# Patient Record
Sex: MALE | Race: WHITE | NOT HISPANIC OR LATINO | Employment: FULL TIME | URBAN - NONMETROPOLITAN AREA
[De-identification: names, ages, dates, MRNs, and addresses within clinical notes are randomized per-mention and may not be internally consistent; named-entity substitution may affect disease eponyms.]

---

## 2023-02-27 PROBLEM — M25.561 RIGHT KNEE PAIN: Status: ACTIVE | Noted: 2023-02-27

## 2023-02-27 PROBLEM — M19.90 ARTHRITIS: Status: ACTIVE | Noted: 2023-02-27

## 2023-02-27 PROBLEM — R76.8 ANTI-RNP ANTIBODIES PRESENT: Status: ACTIVE | Noted: 2023-02-27

## 2023-02-27 PROBLEM — E55.9 VITAMIN D DEFICIENCY: Status: ACTIVE | Noted: 2023-02-27

## 2023-02-27 PROBLEM — K21.00 GERD WITH ESOPHAGITIS: Status: ACTIVE | Noted: 2023-02-27

## 2023-02-27 PROBLEM — M54.50 LOW BACK PAIN: Status: ACTIVE | Noted: 2023-02-27

## 2023-02-27 PROBLEM — R29.818: Status: ACTIVE | Noted: 2023-02-27

## 2023-02-27 PROBLEM — M35.9 CONNECTIVE TISSUE DISEASE (MULTI): Status: ACTIVE | Noted: 2023-02-27

## 2023-02-27 PROBLEM — R61: Status: ACTIVE | Noted: 2023-02-27

## 2023-02-27 PROBLEM — Z04.9 CONDITION NOT FOUND: Status: ACTIVE | Noted: 2023-02-27

## 2023-02-27 PROBLEM — G47.00 INSOMNIA: Status: ACTIVE | Noted: 2023-02-27

## 2023-02-27 PROBLEM — M06.9 RHEUMATOID ARTHRITIS (MULTI): Status: ACTIVE | Noted: 2023-02-27

## 2023-02-27 PROBLEM — K58.0 IRRITABLE BOWEL SYNDROME WITH DIARRHEA: Status: ACTIVE | Noted: 2023-02-27

## 2023-02-27 PROBLEM — C44.91 BASAL CELL CARCINOMA: Status: ACTIVE | Noted: 2023-02-27

## 2023-02-27 PROBLEM — M15.9 GENERALIZED OSTEOARTHRITIS OF MULTIPLE SITES: Status: ACTIVE | Noted: 2023-02-27

## 2023-02-27 PROBLEM — K63.5 COLON POLYP: Status: ACTIVE | Noted: 2023-02-27

## 2023-02-27 PROBLEM — M50.90 DISC DISORDER OF CERVICAL REGION: Status: ACTIVE | Noted: 2023-02-27

## 2023-02-27 PROBLEM — S83.249A TORN MEDIAL MENISCUS: Status: ACTIVE | Noted: 2023-02-27

## 2023-02-27 RX ORDER — ELUXADOLINE 75 MG/1
1 TABLET, FILM COATED ORAL 2 TIMES DAILY
COMMUNITY
End: 2024-03-29 | Stop reason: SDUPTHER

## 2023-02-27 RX ORDER — DEXLANSOPRAZOLE 60 MG/1
1-2 CAPSULE, DELAYED RELEASE ORAL EVERY MORNING
COMMUNITY
End: 2024-05-15 | Stop reason: SDUPTHER

## 2023-02-27 RX ORDER — MULTIVITAMIN
TABLET ORAL
COMMUNITY

## 2023-02-27 RX ORDER — AMITRIPTYLINE HYDROCHLORIDE 25 MG/1
1 TABLET, FILM COATED ORAL NIGHTLY
COMMUNITY
Start: 2016-01-07 | End: 2023-04-17

## 2023-03-31 ENCOUNTER — OFFICE VISIT (OUTPATIENT)
Dept: PRIMARY CARE | Facility: CLINIC | Age: 53
End: 2023-03-31
Payer: COMMERCIAL

## 2023-03-31 VITALS
HEIGHT: 75 IN | WEIGHT: 222.2 LBS | BODY MASS INDEX: 27.63 KG/M2 | SYSTOLIC BLOOD PRESSURE: 142 MMHG | DIASTOLIC BLOOD PRESSURE: 80 MMHG | HEART RATE: 81 BPM | OXYGEN SATURATION: 98 %

## 2023-03-31 DIAGNOSIS — F51.01 PRIMARY INSOMNIA: ICD-10-CM

## 2023-03-31 DIAGNOSIS — Z23 NEED FOR SHINGLES VACCINE: ICD-10-CM

## 2023-03-31 DIAGNOSIS — M19.90 ARTHRITIS: ICD-10-CM

## 2023-03-31 DIAGNOSIS — M18.0 PRIMARY OSTEOARTHRITIS OF BOTH FIRST CARPOMETACARPAL JOINTS: Primary | ICD-10-CM

## 2023-03-31 PROCEDURE — 90750 HZV VACC RECOMBINANT IM: CPT | Performed by: FAMILY MEDICINE

## 2023-03-31 PROCEDURE — 1036F TOBACCO NON-USER: CPT | Performed by: FAMILY MEDICINE

## 2023-03-31 PROCEDURE — 90471 IMMUNIZATION ADMIN: CPT | Performed by: FAMILY MEDICINE

## 2023-03-31 PROCEDURE — 99396 PREV VISIT EST AGE 40-64: CPT | Performed by: FAMILY MEDICINE

## 2023-03-31 RX ORDER — BACLOFEN 20 MG/1
1 TABLET ORAL
COMMUNITY
Start: 2022-07-30 | End: 2023-03-31 | Stop reason: ALTCHOICE

## 2023-03-31 RX ORDER — TRAZODONE HYDROCHLORIDE 50 MG/1
50-150 TABLET ORAL NIGHTLY PRN
Qty: 90 TABLET | Refills: 5 | Status: SHIPPED | OUTPATIENT
Start: 2023-03-31 | End: 2024-05-08

## 2023-03-31 RX ORDER — METHYLPREDNISOLONE ACETATE 80 MG/ML
80 INJECTION, SUSPENSION INTRA-ARTICULAR; INTRALESIONAL; INTRAMUSCULAR; SOFT TISSUE ONCE
Status: COMPLETED | OUTPATIENT
Start: 2023-03-31 | End: 2023-03-31

## 2023-03-31 RX ADMIN — METHYLPREDNISOLONE ACETATE 80 MG: 80 INJECTION, SUSPENSION INTRA-ARTICULAR; INTRALESIONAL; INTRAMUSCULAR; SOFT TISSUE at 10:55

## 2023-03-31 ASSESSMENT — ENCOUNTER SYMPTOMS
DIARRHEA: 0
BLOOD IN STOOL: 0
NERVOUS/ANXIOUS: 0
DIFFICULTY URINATING: 0
CONFUSION: 0
PALPITATIONS: 0
JOINT SWELLING: 0
HEMATURIA: 0
APPETITE CHANGE: 0
SHORTNESS OF BREATH: 0
UNEXPECTED WEIGHT CHANGE: 0
SEIZURES: 0
FEVER: 0
TROUBLE SWALLOWING: 0
COLOR CHANGE: 0
CONSTIPATION: 0

## 2023-03-31 NOTE — PROGRESS NOTES
"Subjective   Patient ID: Norris Peña is a 52 y.o. male who presents for Follow-up.  HPI  Pleasant 52 year old  male presents for a yearly check up. Patient states that he has mixed connective tissue disease and is having issues with his thumbs pulling out of their sockets. Patient requesting referral to a hand surgeon. Patient states that he is having a lot of pain due to mixed connective tissue disease and would like a cortisone injection which helps with the chronic pain. Patient states that he is having issue with being able to fall asleep at night. Patient states that he is working very long hours and travelling and unable to fall asleep. Patient denies fever, chills, night sweats, n/v, abd pain, diarrhea, constipation, CP, heart palpitations.    Review of Systems   Constitutional:  Negative for appetite change, fever and unexpected weight change.   HENT:  Negative for congestion and trouble swallowing.    Eyes:  Negative for visual disturbance.   Respiratory:  Negative for shortness of breath.    Cardiovascular:  Negative for chest pain, palpitations and leg swelling.   Gastrointestinal:  Negative for blood in stool, constipation and diarrhea.   Genitourinary:  Negative for difficulty urinating and hematuria.   Musculoskeletal:  Negative for gait problem and joint swelling.   Skin:  Negative for color change.   Allergic/Immunologic: Negative for immunocompromised state.   Neurological:  Negative for seizures and syncope.   Psychiatric/Behavioral:  Negative for confusion and suicidal ideas. The patient is not nervous/anxious.        Objective   /80   Pulse 81   Ht 1.905 m (6' 3\")   Wt 101 kg (222 lb 3.2 oz)   SpO2 98%   BMI 27.77 kg/m²     Physical Exam  Constitutional:       General: He is not in acute distress.     Appearance: Normal appearance. He is not ill-appearing.   HENT:      Head: Normocephalic and atraumatic.      Right Ear: Tympanic membrane normal.      Left Ear: Tympanic membrane " normal.      Nose: Nose normal.      Mouth/Throat:      Mouth: Mucous membranes are moist.   Eyes:      Pupils: Pupils are equal, round, and reactive to light.   Cardiovascular:      Rate and Rhythm: Normal rate and regular rhythm.      Heart sounds: No murmur heard.     No friction rub. No gallop.   Pulmonary:      Effort: Pulmonary effort is normal.      Breath sounds: Normal breath sounds.   Abdominal:      General: Abdomen is flat. There is no distension.      Palpations: Abdomen is soft.      Tenderness: There is no abdominal tenderness. There is no guarding.      Hernia: No hernia is present.   Musculoskeletal:         General: Normal range of motion.   Skin:     General: Skin is warm and dry.   Neurological:      General: No focal deficit present.      Mental Status: He is alert. Mental status is at baseline.      Cranial Nerves: No cranial nerve deficit.      Motor: No weakness.      Gait: Gait normal.   Psychiatric:         Mood and Affect: Mood normal.         Behavior: Behavior normal.         Thought Content: Thought content normal.         Judgment: Judgment normal.       Assessment/Plan   Problem List Items Addressed This Visit    None    Assessment:  -Mixed connective tissue disease: Possibly causing low back pain: faild Plaquenil methotrexate,arava, Nucynta, butrans  Depo today   -Julio CMC arthritis:  Refer to ortho  -Insomnia  Failed mirtazapine, amitriptyline  -Hyperlipidemia  -Vitamin D deficiency  -Multiple polyps    following      Health Maintenance Reminder:  -Blood Work: labs requested  -PSA: labs requested  -Hep C: completed  -Colonoscopy: GI  -Lung Cancer: 50-80y  -AAA: 65-79 smoker.   -Shingrix: >50y  -Prevnar 20: 65y  -Flu: Yearly

## 2023-03-31 NOTE — PROGRESS NOTES
"Subjective   Patient ID: Norris Peña is a 52 y.o. male who presents for Follow-up.  HPI    Review of Systems    Objective   /80   Pulse 81   Ht 1.905 m (6' 3\")   Wt 101 kg (222 lb 3.2 oz)   SpO2 98%   BMI 27.77 kg/m²     Physical Exam    Assessment/Plan   Problem List Items Addressed This Visit    None    Assessment:  -Mixed connective tissue disease: Possibly causing low back pain: faild Plaquenil methotrexate,arava, Nucynta, butrans  -Insomnia  -Hyperlipidemia  -Vitamin D deficiency  -Multiple polyps     Plan:  -continue THC  -continue amitriptyline  -colonoscopy ordered     Health Maintenance Reminder:  -Blood Work: labs requested  -PSA: labs requested  -Hep C: completed  -Colonoscopy: ordered  -Lung Cancer: 50-80y  -AAA: 65-79 smoker.   -Shingrix: >50y  -Prevnar 20: 65y  -Flu: Yearly  "

## 2023-04-03 ASSESSMENT — PATIENT HEALTH QUESTIONNAIRE - PHQ9
1. LITTLE INTEREST OR PLEASURE IN DOING THINGS: NOT AT ALL
2. FEELING DOWN, DEPRESSED OR HOPELESS: NOT AT ALL
SUM OF ALL RESPONSES TO PHQ9 QUESTIONS 1 AND 2: 0

## 2023-04-15 DIAGNOSIS — F51.01 PRIMARY INSOMNIA: Primary | ICD-10-CM

## 2023-04-17 RX ORDER — AMITRIPTYLINE HYDROCHLORIDE 25 MG/1
TABLET, FILM COATED ORAL
Qty: 90 TABLET | Refills: 2 | Status: SHIPPED | OUTPATIENT
Start: 2023-04-17 | End: 2024-03-29 | Stop reason: SDUPTHER

## 2023-09-08 ENCOUNTER — OFFICE VISIT (OUTPATIENT)
Dept: PRIMARY CARE | Facility: CLINIC | Age: 53
End: 2023-09-08
Payer: COMMERCIAL

## 2023-09-08 VITALS — BODY MASS INDEX: 27.62 KG/M2 | WEIGHT: 221 LBS | OXYGEN SATURATION: 96 % | HEART RATE: 77 BPM

## 2023-09-08 DIAGNOSIS — Z23 NEED FOR SHINGLES VACCINE: Primary | ICD-10-CM

## 2023-09-08 DIAGNOSIS — Z12.5 PROSTATE CANCER SCREENING: ICD-10-CM

## 2023-09-08 DIAGNOSIS — M06.00 RHEUMATOID ARTHRITIS WITH NEGATIVE RHEUMATOID FACTOR, INVOLVING UNSPECIFIED SITE (MULTI): ICD-10-CM

## 2023-09-08 DIAGNOSIS — Z13.220 LIPID SCREENING: ICD-10-CM

## 2023-09-08 DIAGNOSIS — Z13.1 DIABETES MELLITUS SCREENING: ICD-10-CM

## 2023-09-08 DIAGNOSIS — B37.0 THRUSH: ICD-10-CM

## 2023-09-08 DIAGNOSIS — Z82.49 FAMILY HISTORY OF CHF (CONGESTIVE HEART FAILURE): ICD-10-CM

## 2023-09-08 DIAGNOSIS — Z13.0 SCREENING FOR DEFICIENCY ANEMIA: ICD-10-CM

## 2023-09-08 LAB
CHOLESTEROL (MG/DL) IN SER/PLAS: 135 MG/DL (ref 0–199)
CHOLESTEROL IN HDL (MG/DL) IN SER/PLAS: 30.6 MG/DL
CHOLESTEROL/HDL RATIO: 4.4
LDL: 71 MG/DL (ref 0–99)
TRIGLYCERIDE (MG/DL) IN SER/PLAS: 167 MG/DL (ref 0–149)
VLDL: 33 MG/DL (ref 0–40)

## 2023-09-08 PROCEDURE — 90750 HZV VACC RECOMBINANT IM: CPT | Performed by: FAMILY MEDICINE

## 2023-09-08 PROCEDURE — 85027 COMPLETE CBC AUTOMATED: CPT

## 2023-09-08 PROCEDURE — 84153 ASSAY OF PSA TOTAL: CPT

## 2023-09-08 PROCEDURE — 90471 IMMUNIZATION ADMIN: CPT | Performed by: FAMILY MEDICINE

## 2023-09-08 PROCEDURE — 80061 LIPID PANEL: CPT

## 2023-09-08 PROCEDURE — 80053 COMPREHEN METABOLIC PANEL: CPT

## 2023-09-08 PROCEDURE — 99214 OFFICE O/P EST MOD 30 MIN: CPT | Performed by: FAMILY MEDICINE

## 2023-09-08 PROCEDURE — 1036F TOBACCO NON-USER: CPT | Performed by: FAMILY MEDICINE

## 2023-09-08 RX ORDER — METOCLOPRAMIDE 10 MG/1
10 TABLET ORAL 4 TIMES DAILY
COMMUNITY
Start: 2023-04-07 | End: 2024-05-20 | Stop reason: WASHOUT

## 2023-09-08 RX ORDER — CLOTRIMAZOLE 10 MG/1
10 LOZENGE ORAL; TOPICAL
Qty: 70 TABLET | Refills: 1 | Status: SHIPPED | OUTPATIENT
Start: 2023-09-08 | End: 2024-04-10

## 2023-09-08 RX ORDER — ONDANSETRON 4 MG/1
4 TABLET, ORALLY DISINTEGRATING ORAL EVERY 8 HOURS PRN
COMMUNITY
Start: 2023-04-07 | End: 2024-05-20 | Stop reason: WASHOUT

## 2023-09-08 ASSESSMENT — ENCOUNTER SYMPTOMS
CONFUSION: 0
HEMATURIA: 0
UNEXPECTED WEIGHT CHANGE: 0
JOINT SWELLING: 0
CONSTIPATION: 0
SEIZURES: 0
COLOR CHANGE: 0
SHORTNESS OF BREATH: 0
PALPITATIONS: 0
FEVER: 0
DIFFICULTY URINATING: 0
APPETITE CHANGE: 0
BLOOD IN STOOL: 0
NERVOUS/ANXIOUS: 0
TROUBLE SWALLOWING: 0
DIARRHEA: 0

## 2023-09-08 NOTE — PROGRESS NOTES
"Subjective   Patient ID: Norris Peña is a 53 y.o. female who presents for Annual Exam (Second shingles shot, chest pains off and on when overwhelmed and stressed, he wants to be referred to cardiologist ).  HPI  53y CM  Here for check up  Only concerned for a stabbing chest pain- feels like a \"really bad muscle pain\"  Usually at rest or when falling asleep  Had a stress test a \"few\" years ago and was normal  No palps, sweating  Under a lot of stress    Chronic pain:  -using thc and prendisone prn    Wants shingles #2    Insomnia:  -trazodone helps    BP elevated when coming in but wife is a nurse and checking and normal     Review of Systems   Constitutional:  Negative for appetite change, fever and unexpected weight change.   HENT:  Negative for congestion and trouble swallowing.    Eyes:  Negative for visual disturbance.   Respiratory:  Negative for shortness of breath.    Cardiovascular:  Negative for chest pain, palpitations and leg swelling.   Gastrointestinal:  Negative for blood in stool, constipation and diarrhea.   Genitourinary:  Negative for difficulty urinating and hematuria.   Musculoskeletal:  Negative for gait problem and joint swelling.   Skin:  Negative for color change.   Allergic/Immunologic: Negative for immunocompromised state.   Neurological:  Negative for seizures and syncope.   Psychiatric/Behavioral:  Negative for confusion and suicidal ideas. The patient is not nervous/anxious.        Objective   Pulse 77   Wt 100 kg (221 lb)   SpO2 96%   BMI 27.62 kg/m²     Physical Exam  Constitutional:       General: She is not in acute distress.     Appearance: Normal appearance. She is not ill-appearing.   HENT:      Head: Normocephalic and atraumatic.      Right Ear: Tympanic membrane normal.      Left Ear: Tympanic membrane normal.      Nose: Nose normal.      Mouth/Throat:      Mouth: Mucous membranes are moist.   Eyes:      Pupils: Pupils are equal, round, and reactive to light.   Cardiovascular: "      Rate and Rhythm: Normal rate and regular rhythm.      Heart sounds: No murmur heard.     No friction rub. No gallop.   Pulmonary:      Effort: Pulmonary effort is normal.      Breath sounds: Normal breath sounds.   Abdominal:      General: Abdomen is flat. There is no distension.      Palpations: Abdomen is soft.      Tenderness: There is no abdominal tenderness. There is no guarding.      Hernia: No hernia is present.   Musculoskeletal:         General: Normal range of motion.   Skin:     General: Skin is warm and dry.   Neurological:      General: No focal deficit present.      Mental Status: She is alert. Mental status is at baseline.      Cranial Nerves: No cranial nerve deficit.      Motor: No weakness.      Gait: Gait normal.   Psychiatric:         Mood and Affect: Mood normal.         Behavior: Behavior normal.         Thought Content: Thought content normal.         Judgment: Judgment normal.         Assessment/Plan   Problem List Items Addressed This Visit       Rheumatoid arthritis (CMS/HCC)     Other Visit Diagnoses       Need for shingles vaccine    -  Primary    Relevant Orders    Zoster vaccine, recombinant, adult (SHINGRIX) (Completed)    Screening for deficiency anemia        Relevant Orders    CBC (Completed)    Diabetes mellitus screening        Relevant Orders    Comprehensive Metabolic Panel (Completed)    Lipid screening        Relevant Orders    Lipid Panel (Completed)    Prostate cancer screening        Relevant Orders    Prostate Specific Antigen, Screen (Completed)    Family history of CHF (congestive heart failure)        Relevant Orders    CT cardiac scoring wo IV contrast    Thrush        Relevant Medications    clotrimazole (Mycelex) 10 mg miik          Assessment:  -Mixed connective tissue disease: Possibly causing low back pain: faild Plaquenil methotrexate,arava, Nucynta, butrans  Depo today     -Julio CMC arthritis:  Refer to ortho    -Insomnia  Failed mirtazapine,  amitriptyline    -Hyperlipidemia    -Vitamin D deficiency    -Multiple polyps    following      Health Maintenance Reminder:  -Blood Work: today  -PSA: today  -Hep C: completed  -Colonoscopy: GI  -Lung Cancer: 50-80y  -AAA: 65-79 smoker.   -Shingrix: >50y  -Prevnar 20: 65y  -Flu: Yearly

## 2023-09-11 LAB
ALANINE AMINOTRANSFERASE (SGPT) (U/L) IN SER/PLAS: 16 U/L (ref 10–52)
ALBUMIN (G/DL) IN SER/PLAS: 4.4 G/DL (ref 3.4–5)
ALKALINE PHOSPHATASE (U/L) IN SER/PLAS: 45 U/L (ref 33–120)
ANION GAP IN SER/PLAS: 13 MMOL/L (ref 10–20)
ASPARTATE AMINOTRANSFERASE (SGOT) (U/L) IN SER/PLAS: 16 U/L (ref 9–39)
BILIRUBIN TOTAL (MG/DL) IN SER/PLAS: 0.6 MG/DL (ref 0–1.2)
CALCIUM (MG/DL) IN SER/PLAS: 9.4 MG/DL (ref 8.6–10.3)
CARBON DIOXIDE, TOTAL (MMOL/L) IN SER/PLAS: 27 MMOL/L (ref 21–32)
CHLORIDE (MMOL/L) IN SER/PLAS: 102 MMOL/L (ref 98–107)
CREATININE (MG/DL) IN SER/PLAS: 1.16 MG/DL (ref 0.5–1.3)
ERYTHROCYTE DISTRIBUTION WIDTH (RATIO) BY AUTOMATED COUNT: 12.5 % (ref 11.5–14.5)
ERYTHROCYTE MEAN CORPUSCULAR HEMOGLOBIN CONCENTRATION (G/DL) BY AUTOMATED: 32.4 G/DL (ref 32–36)
ERYTHROCYTE MEAN CORPUSCULAR VOLUME (FL) BY AUTOMATED COUNT: 90 FL (ref 80–100)
ERYTHROCYTES (10*6/UL) IN BLOOD BY AUTOMATED COUNT: 5.74 X10E12/L (ref 4.5–5.9)
GFR MALE: 76 ML/MIN/1.73M2
GLUCOSE (MG/DL) IN SER/PLAS: 90 MG/DL (ref 74–99)
HEMATOCRIT (%) IN BLOOD BY AUTOMATED COUNT: 51.9 % (ref 41–52)
HEMOGLOBIN (G/DL) IN BLOOD: 16.8 G/DL (ref 13.5–17.5)
LEUKOCYTES (10*3/UL) IN BLOOD BY AUTOMATED COUNT: 5.5 X10E9/L (ref 4.4–11.3)
PLATELETS (10*3/UL) IN BLOOD AUTOMATED COUNT: 197 X10E9/L (ref 150–450)
POTASSIUM (MMOL/L) IN SER/PLAS: 4.1 MMOL/L (ref 3.5–5.3)
PROSTATE SPECIFIC ANTIGEN,SCREEN: 0.77 NG/ML (ref 0–4)
PROTEIN TOTAL: 6.6 G/DL (ref 6.4–8.2)
SODIUM (MMOL/L) IN SER/PLAS: 138 MMOL/L (ref 136–145)
UREA NITROGEN (MG/DL) IN SER/PLAS: 10 MG/DL (ref 6–23)

## 2023-09-11 ASSESSMENT — PATIENT HEALTH QUESTIONNAIRE - PHQ9
1. LITTLE INTEREST OR PLEASURE IN DOING THINGS: NOT AT ALL
SUM OF ALL RESPONSES TO PHQ9 QUESTIONS 1 AND 2: 0
2. FEELING DOWN, DEPRESSED OR HOPELESS: NOT AT ALL

## 2023-11-20 DIAGNOSIS — J20.9 ACUTE BRONCHITIS, UNSPECIFIED ORGANISM: Primary | ICD-10-CM

## 2023-11-20 RX ORDER — AZITHROMYCIN 250 MG/1
TABLET, FILM COATED ORAL
Qty: 6 TABLET | Refills: 0 | Status: SHIPPED | OUTPATIENT
Start: 2023-11-20 | End: 2023-11-25

## 2023-11-20 RX ORDER — ALBUTEROL SULFATE 90 UG/1
2 AEROSOL, METERED RESPIRATORY (INHALATION) EVERY 4 HOURS PRN
Qty: 8 G | Refills: 5 | Status: SHIPPED | OUTPATIENT
Start: 2023-11-20 | End: 2024-05-20 | Stop reason: WASHOUT

## 2023-12-14 DIAGNOSIS — J20.9 ACUTE BRONCHITIS, UNSPECIFIED ORGANISM: Primary | ICD-10-CM

## 2023-12-14 RX ORDER — PREDNISONE 20 MG/1
40 TABLET ORAL DAILY
Qty: 10 TABLET | Refills: 0 | Status: SHIPPED | OUTPATIENT
Start: 2023-12-14 | End: 2023-12-19

## 2023-12-14 RX ORDER — LEVOFLOXACIN 500 MG/1
500 TABLET, FILM COATED ORAL DAILY
Qty: 10 TABLET | Refills: 0 | Status: SHIPPED | OUTPATIENT
Start: 2023-12-14 | End: 2023-12-24

## 2023-12-27 ENCOUNTER — HOSPITAL ENCOUNTER (OUTPATIENT)
Dept: RADIOLOGY | Facility: HOSPITAL | Age: 53
Discharge: HOME | End: 2023-12-27
Payer: COMMERCIAL

## 2023-12-27 DIAGNOSIS — Z82.49 FAMILY HISTORY OF CHF (CONGESTIVE HEART FAILURE): ICD-10-CM

## 2023-12-28 ENCOUNTER — TELEPHONE (OUTPATIENT)
Dept: PRIMARY CARE | Facility: CLINIC | Age: 53
End: 2023-12-28
Payer: COMMERCIAL

## 2023-12-28 NOTE — TELEPHONE ENCOUNTER
He had a heart test scheduled for yesterday but they could not do it due to his HR being 110-115, he usually runs high but in order for them to do the test he needs to be in the 90s, asked if you could send in medication to help it go down?

## 2023-12-29 DIAGNOSIS — R00.0 TACHYCARDIA: Primary | ICD-10-CM

## 2023-12-29 RX ORDER — METOPROLOL TARTRATE 25 MG/1
25 TABLET, FILM COATED ORAL 2 TIMES DAILY
Qty: 30 TABLET | Refills: 0 | Status: SHIPPED | OUTPATIENT
Start: 2023-12-29 | End: 2024-01-13

## 2024-01-04 ENCOUNTER — HOSPITAL ENCOUNTER (OUTPATIENT)
Dept: RADIOLOGY | Facility: HOSPITAL | Age: 54
Discharge: HOME | End: 2024-01-04
Payer: COMMERCIAL

## 2024-01-04 PROCEDURE — 75571 CT HRT W/O DYE W/CA TEST: CPT

## 2024-01-12 DIAGNOSIS — R00.0 TACHYCARDIA: ICD-10-CM

## 2024-01-13 RX ORDER — METOPROLOL TARTRATE 25 MG/1
25 TABLET, FILM COATED ORAL 2 TIMES DAILY
Qty: 180 TABLET | Refills: 1 | Status: SHIPPED | OUTPATIENT
Start: 2024-01-13 | End: 2024-05-20 | Stop reason: WASHOUT

## 2024-01-24 DIAGNOSIS — J40 BRONCHITIS: ICD-10-CM

## 2024-01-24 DIAGNOSIS — R09.81 NASAL CONGESTION: Primary | ICD-10-CM

## 2024-01-24 RX ORDER — IPRATROPIUM BROMIDE 42 UG/1
2 SPRAY, METERED NASAL 3 TIMES DAILY PRN
Qty: 21 ML | Refills: 1 | Status: SHIPPED | OUTPATIENT
Start: 2024-01-24 | End: 2024-02-02

## 2024-01-26 ENCOUNTER — HOSPITAL ENCOUNTER (EMERGENCY)
Facility: HOSPITAL | Age: 54
Discharge: HOME | End: 2024-01-26
Attending: EMERGENCY MEDICINE
Payer: COMMERCIAL

## 2024-01-26 ENCOUNTER — APPOINTMENT (OUTPATIENT)
Dept: CARDIOLOGY | Facility: HOSPITAL | Age: 54
End: 2024-01-26
Payer: COMMERCIAL

## 2024-01-26 ENCOUNTER — APPOINTMENT (OUTPATIENT)
Dept: RADIOLOGY | Facility: HOSPITAL | Age: 54
End: 2024-01-26
Payer: COMMERCIAL

## 2024-01-26 VITALS
WEIGHT: 206 LBS | HEART RATE: 96 BPM | RESPIRATION RATE: 20 BRPM | SYSTOLIC BLOOD PRESSURE: 129 MMHG | BODY MASS INDEX: 25.61 KG/M2 | OXYGEN SATURATION: 97 % | DIASTOLIC BLOOD PRESSURE: 79 MMHG | TEMPERATURE: 99 F | HEIGHT: 75 IN

## 2024-01-26 DIAGNOSIS — R05.1 ACUTE COUGH: Primary | ICD-10-CM

## 2024-01-26 LAB
ALBUMIN SERPL BCP-MCNC: 4.1 G/DL (ref 3.4–5)
ALP SERPL-CCNC: 44 U/L (ref 33–120)
ALT SERPL W P-5'-P-CCNC: 12 U/L (ref 10–52)
ANION GAP SERPL CALC-SCNC: 11 MMOL/L (ref 10–20)
AST SERPL W P-5'-P-CCNC: 13 U/L (ref 9–39)
BASOPHILS # BLD AUTO: 0.03 X10*3/UL (ref 0–0.1)
BASOPHILS NFR BLD AUTO: 0.2 %
BILIRUB SERPL-MCNC: 0.7 MG/DL (ref 0–1.2)
BUN SERPL-MCNC: 8 MG/DL (ref 6–23)
CALCIUM SERPL-MCNC: 9 MG/DL (ref 8.6–10.3)
CHLORIDE SERPL-SCNC: 102 MMOL/L (ref 98–107)
CO2 SERPL-SCNC: 25 MMOL/L (ref 21–32)
CREAT SERPL-MCNC: 1.1 MG/DL (ref 0.5–1.3)
EGFRCR SERPLBLD CKD-EPI 2021: 81 ML/MIN/1.73M*2
EOSINOPHIL # BLD AUTO: 0.03 X10*3/UL (ref 0–0.7)
EOSINOPHIL NFR BLD AUTO: 0.2 %
ERYTHROCYTE [DISTWIDTH] IN BLOOD BY AUTOMATED COUNT: 12 % (ref 11.5–14.5)
FLUAV RNA RESP QL NAA+PROBE: NOT DETECTED
FLUBV RNA RESP QL NAA+PROBE: NOT DETECTED
GLUCOSE SERPL-MCNC: 118 MG/DL (ref 74–99)
HCT VFR BLD AUTO: 46.7 % (ref 41–52)
HGB BLD-MCNC: 16.1 G/DL (ref 13.5–17.5)
IMM GRANULOCYTES # BLD AUTO: 0.11 X10*3/UL (ref 0–0.7)
IMM GRANULOCYTES NFR BLD AUTO: 0.7 % (ref 0–0.9)
LYMPHOCYTES # BLD AUTO: 0.28 X10*3/UL (ref 1.2–4.8)
LYMPHOCYTES NFR BLD AUTO: 1.8 %
MAGNESIUM SERPL-MCNC: 1.75 MG/DL (ref 1.6–2.4)
MCH RBC QN AUTO: 29.7 PG (ref 26–34)
MCHC RBC AUTO-ENTMCNC: 34.5 G/DL (ref 32–36)
MCV RBC AUTO: 86 FL (ref 80–100)
MONOCYTES # BLD AUTO: 0.54 X10*3/UL (ref 0.1–1)
MONOCYTES NFR BLD AUTO: 3.5 %
NEUTROPHILS # BLD AUTO: 14.49 X10*3/UL (ref 1.2–7.7)
NEUTROPHILS NFR BLD AUTO: 93.6 %
NRBC BLD-RTO: 0 /100 WBCS (ref 0–0)
PLATELET # BLD AUTO: 195 X10*3/UL (ref 150–450)
POTASSIUM SERPL-SCNC: 3.7 MMOL/L (ref 3.5–5.3)
PROT SERPL-MCNC: 6.8 G/DL (ref 6.4–8.2)
RBC # BLD AUTO: 5.42 X10*6/UL (ref 4.5–5.9)
SARS-COV-2 RNA RESP QL NAA+PROBE: NOT DETECTED
SODIUM SERPL-SCNC: 134 MMOL/L (ref 136–145)
WBC # BLD AUTO: 15.5 X10*3/UL (ref 4.4–11.3)

## 2024-01-26 PROCEDURE — 85025 COMPLETE CBC W/AUTO DIFF WBC: CPT | Performed by: EMERGENCY MEDICINE

## 2024-01-26 PROCEDURE — 71275 CT ANGIOGRAPHY CHEST: CPT

## 2024-01-26 PROCEDURE — 93005 ELECTROCARDIOGRAM TRACING: CPT

## 2024-01-26 PROCEDURE — 83735 ASSAY OF MAGNESIUM: CPT | Performed by: EMERGENCY MEDICINE

## 2024-01-26 PROCEDURE — 2500000004 HC RX 250 GENERAL PHARMACY W/ HCPCS (ALT 636 FOR OP/ED): Performed by: EMERGENCY MEDICINE

## 2024-01-26 PROCEDURE — 71275 CT ANGIOGRAPHY CHEST: CPT | Performed by: RADIOLOGY

## 2024-01-26 PROCEDURE — 36415 COLL VENOUS BLD VENIPUNCTURE: CPT | Performed by: EMERGENCY MEDICINE

## 2024-01-26 PROCEDURE — 80053 COMPREHEN METABOLIC PANEL: CPT | Performed by: EMERGENCY MEDICINE

## 2024-01-26 PROCEDURE — 2500000001 HC RX 250 WO HCPCS SELF ADMINISTERED DRUGS (ALT 637 FOR MEDICARE OP): Performed by: EMERGENCY MEDICINE

## 2024-01-26 PROCEDURE — 99285 EMERGENCY DEPT VISIT HI MDM: CPT | Mod: 25

## 2024-01-26 PROCEDURE — 87636 SARSCOV2 & INF A&B AMP PRB: CPT | Performed by: EMERGENCY MEDICINE

## 2024-01-26 PROCEDURE — 2550000001 HC RX 255 CONTRASTS: Performed by: EMERGENCY MEDICINE

## 2024-01-26 PROCEDURE — 99284 EMERGENCY DEPT VISIT MOD MDM: CPT | Performed by: EMERGENCY MEDICINE

## 2024-01-26 RX ORDER — GUAIFENESIN, PSEUDOEPHEDRINE HYDROCHLORIDE 600; 60 MG/1; MG/1
1-2 TABLET, EXTENDED RELEASE ORAL EVERY 12 HOURS
Qty: 14 TABLET | Refills: 0 | Status: SHIPPED | OUTPATIENT
Start: 2024-01-26 | End: 2024-05-20 | Stop reason: WASHOUT

## 2024-01-26 RX ORDER — GUAIFENESIN 100 MG/5ML
400 SOLUTION ORAL ONCE
Status: COMPLETED | OUTPATIENT
Start: 2024-01-26 | End: 2024-01-26

## 2024-01-26 RX ORDER — BENZONATATE 100 MG/1
100 CAPSULE ORAL EVERY 8 HOURS
Qty: 21 CAPSULE | Refills: 0 | Status: SHIPPED | OUTPATIENT
Start: 2024-01-26 | End: 2024-02-02 | Stop reason: SDUPTHER

## 2024-01-26 RX ORDER — BENZONATATE 100 MG/1
100 CAPSULE ORAL ONCE
Status: COMPLETED | OUTPATIENT
Start: 2024-01-26 | End: 2024-01-26

## 2024-01-26 RX ORDER — ACETAMINOPHEN 325 MG/1
650 TABLET ORAL ONCE
Status: COMPLETED | OUTPATIENT
Start: 2024-01-26 | End: 2024-01-26

## 2024-01-26 RX ADMIN — SODIUM CHLORIDE, POTASSIUM CHLORIDE, SODIUM LACTATE AND CALCIUM CHLORIDE 1000 ML: 600; 310; 30; 20 INJECTION, SOLUTION INTRAVENOUS at 09:19

## 2024-01-26 RX ADMIN — BENZONATATE 100 MG: 100 CAPSULE ORAL at 09:20

## 2024-01-26 RX ADMIN — ACETAMINOPHEN 650 MG: 325 TABLET ORAL at 09:20

## 2024-01-26 RX ADMIN — GUAIFENESIN 400 MG: 200 SOLUTION ORAL at 09:19

## 2024-01-26 RX ADMIN — IOHEXOL 71 ML: 350 INJECTION, SOLUTION INTRAVENOUS at 10:00

## 2024-01-26 ASSESSMENT — PAIN SCALES - GENERAL
PAINLEVEL_OUTOF10: 4
PAINLEVEL_OUTOF10: 0 - NO PAIN

## 2024-01-26 ASSESSMENT — PAIN DESCRIPTION - DESCRIPTORS: DESCRIPTORS: ACHING

## 2024-01-26 ASSESSMENT — COLUMBIA-SUICIDE SEVERITY RATING SCALE - C-SSRS
6. HAVE YOU EVER DONE ANYTHING, STARTED TO DO ANYTHING, OR PREPARED TO DO ANYTHING TO END YOUR LIFE?: NO
1. IN THE PAST MONTH, HAVE YOU WISHED YOU WERE DEAD OR WISHED YOU COULD GO TO SLEEP AND NOT WAKE UP?: NO
2. HAVE YOU ACTUALLY HAD ANY THOUGHTS OF KILLING YOURSELF?: NO

## 2024-01-26 ASSESSMENT — LIFESTYLE VARIABLES
EVER HAD A DRINK FIRST THING IN THE MORNING TO STEADY YOUR NERVES TO GET RID OF A HANGOVER: NO
HAVE YOU EVER FELT YOU SHOULD CUT DOWN ON YOUR DRINKING: NO
HAVE PEOPLE ANNOYED YOU BY CRITICIZING YOUR DRINKING: NO
REASON UNABLE TO ASSESS: NO
EVER FELT BAD OR GUILTY ABOUT YOUR DRINKING: NO

## 2024-01-26 ASSESSMENT — PAIN DESCRIPTION - PAIN TYPE: TYPE: ACUTE PAIN

## 2024-01-26 ASSESSMENT — PAIN DESCRIPTION - LOCATION: LOCATION: CHEST

## 2024-01-26 ASSESSMENT — PAIN - FUNCTIONAL ASSESSMENT: PAIN_FUNCTIONAL_ASSESSMENT: 0-10

## 2024-01-26 ASSESSMENT — PAIN DESCRIPTION - ORIENTATION: ORIENTATION: RIGHT

## 2024-01-26 NOTE — ED PROVIDER NOTES
"HPI   Chief Complaint   Patient presents with    Cough       53-year-old male with history of connective tissue presents to the ED for several months of cough with no chest pain.  He reports being seen by his primary care provider and prescribed breathing treatments, steroids and antibiotics with minimal relief.  He reports worsening cough at night.  He is complaining of right-sided sharp chest pain which feels like rib pain worsened with cough.  Denies any trauma.  Denies any fevers.  Reports 40 pound weight loss over the last year he reports due to \"stress.\"                          [place] Coma Scale Score: 15                  Patient History   Past Medical History:   Diagnosis Date    Disorder of the skin and subcutaneous tissue, unspecified 05/25/2018    Skin lesion    Encounter for immunization 10/27/2015    Needs flu shot    Encounter for immunization 09/06/2016    Need for prophylactic vaccination and inoculation against influenza    Encounter for screening for diabetes mellitus 06/15/2017    Diabetes mellitus screening    Encounter for screening for diseases of the blood and blood-forming organs and certain disorders involving the immune mechanism 06/15/2017    Screening for deficiency anemia    Encounter for screening for lipoid disorders 06/15/2017    Screening, lipid    Encounter for screening for respiratory tuberculosis 12/31/2015    Tuberculosis screening    Hemorrhage of anus and rectum 09/17/2012    Hemorrhage of rectum and anus    Other conditions influencing health status 05/11/2015    Myalgia and myositis    Pain in unspecified joint 05/03/2016    Joint pain    Pain in unspecified knee 05/03/2016    Knee pain    Personal history of other diseases of the nervous system and sense organs 04/19/2013    History of sciatica    Personal history of other infectious and parasitic diseases 04/12/2017    History of candidiasis of mouth    Personal history of other mental and behavioral disorders 02/18/2013 "    History of depression    Personal history of other specified conditions 09/02/2016    History of diarrhea    Personal history of other specified conditions 07/20/2016    History of shortness of breath    Testicular hypofunction 07/05/2016    Testicular hypofunction    Unspecified injury of unspecified lower leg, initial encounter 05/18/2015    Lower extremity injury     Past Surgical History:   Procedure Laterality Date    CERVICAL DISCECTOMY  02/23/2015    Spinal Diskectomy Cervical    OTHER SURGICAL HISTORY  02/23/2015    Spinal Diskectomy Lumbar     Family History   Problem Relation Name Age of Onset    Heart failure Father      Lupus Sister       Social History     Tobacco Use    Smoking status: Never     Passive exposure: Never    Smokeless tobacco: Never   Substance Use Topics    Alcohol use: Never    Drug use: Never       Physical Exam   ED Triage Vitals [01/26/24 0848]   Temperature Heart Rate Respirations BP   37.2 °C (99 °F) (!) 126 20 (!) 141/91      Pulse Ox Temp Source Heart Rate Source Patient Position   96 % Tympanic Monitor --      BP Location FiO2 (%)     -- --       Physical Exam  Vitals and nursing note reviewed.   Constitutional:       Appearance: Normal appearance.   HENT:      Head: Normocephalic and atraumatic.   Eyes:      Extraocular Movements: Extraocular movements intact.      Pupils: Pupils are equal, round, and reactive to light.   Cardiovascular:      Rate and Rhythm: Tachycardia present.   Pulmonary:      Effort: Pulmonary effort is normal.      Breath sounds: Normal breath sounds.   Abdominal:      General: Abdomen is flat.      Palpations: Abdomen is soft.   Musculoskeletal:         General: Normal range of motion.      Cervical back: Normal range of motion and neck supple.   Skin:     General: Skin is warm and dry.      Capillary Refill: Capillary refill takes less than 2 seconds.   Neurological:      General: No focal deficit present.      Mental Status: He is alert and oriented  to person, place, and time.   Psychiatric:         Mood and Affect: Mood normal.         Behavior: Behavior normal.         Thought Content: Thought content normal.         Judgment: Judgment normal.         ED Course & MDM   Diagnoses as of 01/26/24 1620   Acute cough       Medical Decision Making  EKG:  Sinus rhythm rate 114.  No ST segment elevations depressions.  No T wave abnormalities.  Normal axis normal normals.  No prior EKG compare.    53-year-old male presents to the ED for several weeks of cough.  He is tachycardic upon arrival to the ED.  Otherwise in no distress.  EKG shows sinus tachycardia.  Electrolytes within normal limits.  Blood counts within normal limits.  CT angio of his chest is negative for PE, consolidation, pulmonary edema or other acute pathology.  He was treated with Tessalon Perles and had some improvement in his symptoms.  I recommend pulmonology follow-up.  Heart rate improved with fluids.  Possibly component of dehydration.  I have very low suspicion for acute coronary syndrome.  All questions were answered.        Procedure  Procedures     Gato Hernández MD  01/26/24 1624

## 2024-01-26 NOTE — ED TRIAGE NOTES
"Patient c/o a cough for the past 4 months, patient was seen by PCP, put on meds and steroids, he states he got better for a bit but now the cough is causing him to lose sleep and he developed right sided chest pain into his flank because he believes he \"broke a rib\" from coughing.   "

## 2024-01-30 LAB
ATRIAL RATE: 114 BPM
P AXIS: 74 DEGREES
P OFFSET: 193 MS
P ONSET: 136 MS
PR INTERVAL: 168 MS
Q ONSET: 220 MS
QRS COUNT: 19 BEATS
QRS DURATION: 92 MS
QT INTERVAL: 288 MS
QTC CALCULATION(BAZETT): 396 MS
QTC FREDERICIA: 356 MS
R AXIS: 26 DEGREES
T AXIS: 74 DEGREES
T OFFSET: 364 MS
VENTRICULAR RATE: 114 BPM

## 2024-02-01 DIAGNOSIS — R09.81 NASAL CONGESTION: ICD-10-CM

## 2024-02-02 DIAGNOSIS — R05.1 ACUTE COUGH: ICD-10-CM

## 2024-02-02 RX ORDER — IPRATROPIUM BROMIDE 42 UG/1
2 SPRAY, METERED NASAL 3 TIMES DAILY PRN
Qty: 15 ML | Refills: 1 | Status: SHIPPED | OUTPATIENT
Start: 2024-02-02 | End: 2024-02-21

## 2024-02-02 RX ORDER — BENZONATATE 100 MG/1
100 CAPSULE ORAL EVERY 8 HOURS
Qty: 21 CAPSULE | Refills: 0 | Status: SHIPPED | OUTPATIENT
Start: 2024-02-02 | End: 2024-02-09

## 2024-02-02 RX ORDER — BENZONATATE 100 MG/1
100 CAPSULE ORAL EVERY 8 HOURS
Qty: 21 CAPSULE | Refills: 2 | Status: SHIPPED | OUTPATIENT
Start: 2024-02-02 | End: 2024-02-23

## 2024-02-09 ENCOUNTER — HOSPITAL ENCOUNTER (OUTPATIENT)
Dept: RADIOLOGY | Facility: HOSPITAL | Age: 54
Discharge: HOME | End: 2024-02-09
Payer: COMMERCIAL

## 2024-02-09 DIAGNOSIS — M54.6 PAIN IN THORACIC SPINE: ICD-10-CM

## 2024-02-09 DIAGNOSIS — R07.89 OTHER CHEST PAIN: ICD-10-CM

## 2024-02-09 PROCEDURE — 78315 BONE IMAGING 3 PHASE: CPT | Performed by: RADIOLOGY

## 2024-02-09 PROCEDURE — 3430000001 HC RX 343 DIAGNOSTIC RADIOPHARMACEUTICALS: Performed by: INTERNAL MEDICINE

## 2024-02-09 PROCEDURE — 78315 BONE IMAGING 3 PHASE: CPT

## 2024-02-09 PROCEDURE — A9503 TC99M MEDRONATE: HCPCS | Performed by: INTERNAL MEDICINE

## 2024-02-09 RX ADMIN — TECHNETIUM TC 99M MEDRONATE 26.1 MILLICURIE: 25 INJECTION, POWDER, FOR SOLUTION INTRAVENOUS at 12:30

## 2024-02-12 ENCOUNTER — OFFICE VISIT (OUTPATIENT)
Dept: GASTROENTEROLOGY | Facility: EXTERNAL LOCATION | Age: 54
End: 2024-02-12
Payer: COMMERCIAL

## 2024-02-12 DIAGNOSIS — R12 HEARTBURN: Primary | ICD-10-CM

## 2024-02-12 DIAGNOSIS — K21.00 GASTROESOPHAGEAL REFLUX DISEASE WITH ESOPHAGITIS WITHOUT HEMORRHAGE: ICD-10-CM

## 2024-02-12 DIAGNOSIS — I10 ESSENTIAL HYPERTENSION, BENIGN: ICD-10-CM

## 2024-02-12 DIAGNOSIS — K31.7 GASTRIC POLYP: ICD-10-CM

## 2024-02-12 DIAGNOSIS — K21.9 GASTRO-ESOPHAGEAL REFLUX DISEASE WITHOUT ESOPHAGITIS: ICD-10-CM

## 2024-02-12 PROCEDURE — 43235 EGD DIAGNOSTIC BRUSH WASH: CPT | Performed by: INTERNAL MEDICINE

## 2024-02-12 PROCEDURE — 1036F TOBACCO NON-USER: CPT | Performed by: INTERNAL MEDICINE

## 2024-02-20 DIAGNOSIS — R09.81 NASAL CONGESTION: ICD-10-CM

## 2024-02-21 RX ORDER — IPRATROPIUM BROMIDE 42 UG/1
2 SPRAY, METERED NASAL 3 TIMES DAILY PRN
Qty: 45 ML | Refills: 1 | Status: SHIPPED | OUTPATIENT
Start: 2024-02-21 | End: 2024-05-21

## 2024-02-27 ENCOUNTER — APPOINTMENT (OUTPATIENT)
Dept: PULMONOLOGY | Facility: CLINIC | Age: 54
End: 2024-02-27
Payer: COMMERCIAL

## 2024-02-27 DIAGNOSIS — M06.00 RHEUMATOID ARTHRITIS WITH NEGATIVE RHEUMATOID FACTOR, INVOLVING UNSPECIFIED SITE (MULTI): Primary | ICD-10-CM

## 2024-03-28 ENCOUNTER — TELEPHONE (OUTPATIENT)
Dept: PRIMARY CARE | Facility: CLINIC | Age: 54
End: 2024-03-28
Payer: COMMERCIAL

## 2024-03-28 DIAGNOSIS — R09.81 SINUS CONGESTION: Primary | ICD-10-CM

## 2024-03-28 RX ORDER — AZITHROMYCIN 250 MG/1
TABLET, FILM COATED ORAL
Qty: 6 TABLET | Refills: 0 | Status: SHIPPED | OUTPATIENT
Start: 2024-03-28 | End: 2024-04-02

## 2024-03-28 NOTE — TELEPHONE ENCOUNTER
Good morning,    It seems we all picked up something bad at the house, my sinuses and ears are closing bad with yellow discharge. If you can shoot me a script to help, it would be super appreciated!    [facility] in [place].    I hope you guys have a great Easter!    Thank you!  Scot

## 2024-03-29 DIAGNOSIS — K58.0 IRRITABLE BOWEL SYNDROME WITH DIARRHEA: ICD-10-CM

## 2024-03-29 DIAGNOSIS — F51.01 PRIMARY INSOMNIA: ICD-10-CM

## 2024-03-29 RX ORDER — AMITRIPTYLINE HYDROCHLORIDE 25 MG/1
25 TABLET, FILM COATED ORAL NIGHTLY
Qty: 90 TABLET | Refills: 2 | Status: SHIPPED | OUTPATIENT
Start: 2024-03-29

## 2024-03-31 RX ORDER — ELUXADOLINE 75 MG/1
1 TABLET, FILM COATED ORAL 2 TIMES DAILY
Qty: 180 TABLET | Refills: 1 | Status: SHIPPED | OUTPATIENT
Start: 2024-03-31

## 2024-04-10 DIAGNOSIS — B37.0 THRUSH: ICD-10-CM

## 2024-04-10 RX ORDER — CLOTRIMAZOLE 10 MG/1
LOZENGE ORAL; TOPICAL
Qty: 70 TROCHE | Refills: 1 | Status: SHIPPED | OUTPATIENT
Start: 2024-04-10

## 2024-05-08 DIAGNOSIS — F51.01 PRIMARY INSOMNIA: ICD-10-CM

## 2024-05-08 RX ORDER — TRAZODONE HYDROCHLORIDE 50 MG/1
TABLET ORAL
Qty: 90 TABLET | Refills: 5 | Status: SHIPPED | OUTPATIENT
Start: 2024-05-08

## 2024-05-15 ENCOUNTER — TELEPHONE (OUTPATIENT)
Dept: PRIMARY CARE | Facility: CLINIC | Age: 54
End: 2024-05-15
Payer: COMMERCIAL

## 2024-05-15 DIAGNOSIS — K21.00 GASTROESOPHAGEAL REFLUX DISEASE WITH ESOPHAGITIS WITHOUT HEMORRHAGE: Primary | ICD-10-CM

## 2024-05-15 RX ORDER — DEXLANSOPRAZOLE 60 MG/1
60 CAPSULE, DELAYED RELEASE ORAL EVERY MORNING
Qty: 90 CAPSULE | Refills: 3 | Status: SHIPPED | OUTPATIENT
Start: 2024-05-15 | End: 2024-05-20 | Stop reason: SDUPTHER

## 2024-05-15 NOTE — TELEPHONE ENCOUNTER
His gastro doc's practice closed down and he got no notice about it, so he asked if you could refill his dexlansoprazole for 3 months until he gets appt please

## 2024-05-16 NOTE — TELEPHONE ENCOUNTER
Okay can you send to [facility] in [place] instead, he said there is an $800 difference with the pharmacies, sorry

## 2024-05-20 RX ORDER — DEXLANSOPRAZOLE 60 MG/1
60 CAPSULE, DELAYED RELEASE ORAL EVERY MORNING
Qty: 90 CAPSULE | Refills: 3 | Status: SHIPPED | OUTPATIENT
Start: 2024-05-20

## 2024-07-18 ENCOUNTER — APPOINTMENT (OUTPATIENT)
Dept: PRIMARY CARE | Facility: CLINIC | Age: 54
End: 2024-07-18
Payer: COMMERCIAL

## 2024-07-18 VITALS
SYSTOLIC BLOOD PRESSURE: 118 MMHG | DIASTOLIC BLOOD PRESSURE: 80 MMHG | WEIGHT: 218 LBS | OXYGEN SATURATION: 96 % | HEART RATE: 86 BPM | BODY MASS INDEX: 27.25 KG/M2

## 2024-07-18 DIAGNOSIS — M50.90 DISC DISORDER OF CERVICAL REGION: Primary | ICD-10-CM

## 2024-07-18 DIAGNOSIS — M06.00 RHEUMATOID ARTHRITIS WITH NEGATIVE RHEUMATOID FACTOR, INVOLVING UNSPECIFIED SITE (MULTI): ICD-10-CM

## 2024-07-18 DIAGNOSIS — F51.01 PRIMARY INSOMNIA: ICD-10-CM

## 2024-07-18 RX ORDER — METHYLPREDNISOLONE ACETATE 40 MG/ML
40 INJECTION, SUSPENSION INTRA-ARTICULAR; INTRALESIONAL; INTRAMUSCULAR; SOFT TISSUE ONCE
Status: COMPLETED | OUTPATIENT
Start: 2024-07-18 | End: 2024-07-18

## 2024-07-18 RX ORDER — AMITRIPTYLINE HYDROCHLORIDE 25 MG/1
25 TABLET, FILM COATED ORAL NIGHTLY
Qty: 90 TABLET | Refills: 3 | Status: SHIPPED | OUTPATIENT
Start: 2024-07-18 | End: 2025-07-13

## 2024-07-18 ASSESSMENT — ENCOUNTER SYMPTOMS
CONFUSION: 0
APPETITE CHANGE: 0
CONSTIPATION: 0
SHORTNESS OF BREATH: 0
UNEXPECTED WEIGHT CHANGE: 0
NECK PAIN: 1
DIFFICULTY URINATING: 0
JOINT SWELLING: 0
COLOR CHANGE: 0
SEIZURES: 0
NERVOUS/ANXIOUS: 0
DIARRHEA: 1
PALPITATIONS: 0
BLOOD IN STOOL: 0
TROUBLE SWALLOWING: 0
MYALGIAS: 1
FEVER: 0
HEMATURIA: 0

## 2024-07-18 NOTE — PROGRESS NOTES
Subjective   Patient ID: Norris Peña is a 54 y.o. male who presents for Annual Exam (6 month follow up, shoulder pain ).  HPI  Patient is a 54 year old male who presents to the office for medication refill and neck stiffness. Pt states he needs his amitriptyline refilled. Pt states his neck pain has been cruz on for about 2 weeks without a specific incident causing the pain and tightness. Points to trapezius. History of spinal fusion C5-C7. Pt states his neck muscles are tight and now radiating from neck to traps and mid back. Denies paresthesia, loss of sensation in extremities. Pt has tried some OTC meds but is limited due to NSAIDS causing GI bleeding. Tylenol does not work for him. Pt has not tried massage yet, but is asking for a steroid injection. Pt does have significant connective tissue disorder, follows with Rheumatology and has follow up in Sept. Pt has taken Flexeril in the past with bad side effects and would like to avoid that medication. Pt uses CBD and CBG for his rheum pain and says it takes the edge off but hasn't resolved his symptoms entirely. Pt states this is the best he has felt physically in a long time. Pt denies any other concerns.     Review of Systems   Constitutional:  Negative for appetite change, fever and unexpected weight change.   HENT:  Negative for congestion and trouble swallowing.    Eyes:  Negative for visual disturbance.   Respiratory:  Negative for shortness of breath.    Cardiovascular:  Negative for chest pain, palpitations and leg swelling.   Gastrointestinal:  Positive for diarrhea. Negative for blood in stool and constipation.   Genitourinary:  Negative for difficulty urinating and hematuria.   Musculoskeletal:  Positive for myalgias and neck pain. Negative for gait problem and joint swelling.   Skin:  Negative for color change.   Allergic/Immunologic: Negative for immunocompromised state.   Neurological:  Negative for seizures and syncope.   Psychiatric/Behavioral:   Negative for confusion and suicidal ideas. The patient is not nervous/anxious.      A 10 point ROS was completed and found negative unless otherwise stated in above HPI.   Objective   /80   Pulse 86   Wt 98.9 kg (218 lb)   SpO2 96%   BMI 27.25 kg/m²     Physical Exam  Constitutional:       General: He is not in acute distress.     Appearance: Normal appearance. He is not ill-appearing or toxic-appearing.   HENT:      Head: Normocephalic.      Nose: Nose normal.      Mouth/Throat:      Mouth: Mucous membranes are moist.      Pharynx: Oropharynx is clear.   Eyes:      Extraocular Movements: Extraocular movements intact.      Conjunctiva/sclera: Conjunctivae normal.      Pupils: Pupils are equal, round, and reactive to light.   Neck:      Comments: No erythema, swelling on exam. Muscle tension and tightness palpated along the posterior neck, especially in the trapezius muscle group. Pain on palpation along those areas and into the mid back muscles. No pain along the vertebrae. Decreased ROM in the neck but still able to complete flexion, extension, rotation. No difficulty swallowing, breathing.  Negative Spurling's  Cardiovascular:      Rate and Rhythm: Normal rate and regular rhythm.      Pulses: Normal pulses.      Heart sounds: Normal heart sounds.   Pulmonary:      Effort: Pulmonary effort is normal.      Breath sounds: Normal breath sounds.   Abdominal:      General: Abdomen is flat. There is no distension.      Palpations: Abdomen is soft.      Tenderness: There is no abdominal tenderness. There is no guarding.   Musculoskeletal:         General: Normal range of motion.      Cervical back: Normal range of motion.      Right lower leg: No edema.      Left lower leg: No edema.   Skin:     General: Skin is warm.      Capillary Refill: Capillary refill takes less than 2 seconds.      Findings: No rash.   Neurological:      General: No focal deficit present.      Mental Status: He is alert.      Cranial  Nerves: No cranial nerve deficit.      Sensory: No sensory deficit.      Motor: No weakness.   Psychiatric:         Mood and Affect: Mood normal.         Behavior: Behavior normal.         Assessment/Plan   Problem List Items Addressed This Visit       Disc disorder of cervical region - Primary    Relevant Medications    methylPREDNISolone acetate (DEPO-Medrol) injection 40 mg (Completed)    Insomnia    Relevant Medications    amitriptyline (Elavil) 25 mg tablet     Assessment:  -Mixed connective tissue disease: Possibly causing low back pain: faild Plaquenil methotrexate,arava, Nucynta, butrans  Depo today      -Julio CMC arthritis:  Refer to ortho     -Insomnia  Failed mirtazapine, amitriptyline     -Hyperlipidemia     -Vitamin D deficiency     -Multiple polyps    following      Health Maintenance Reminder:  -Blood Work: today  -PSA: today  -Hep C: completed  -Colonoscopy: GI  -Lung Cancer: 50-80y  -AAA: 65-79 smoker.   -Shingrix: >50y  -Prevnar 20: 65y  -Flu: Yearly

## 2025-01-02 ENCOUNTER — APPOINTMENT (OUTPATIENT)
Dept: PRIMARY CARE | Facility: CLINIC | Age: 55
End: 2025-01-02
Payer: COMMERCIAL

## 2025-01-14 ENCOUNTER — LAB (OUTPATIENT)
Dept: LAB | Facility: LAB | Age: 55
End: 2025-01-14
Payer: COMMERCIAL

## 2025-01-14 DIAGNOSIS — R53.82 CHRONIC FATIGUE, UNSPECIFIED: ICD-10-CM

## 2025-01-14 DIAGNOSIS — E29.1 TESTICULAR HYPOFUNCTION: Primary | ICD-10-CM

## 2025-01-14 DIAGNOSIS — M32.10 SYSTEMIC LUPUS ERYTHEMATOSUS, ORGAN OR SYSTEM INVOLVEMENT UNSPECIFIED (MULTI): ICD-10-CM

## 2025-01-14 DIAGNOSIS — M54.50 LOW BACK PAIN: ICD-10-CM

## 2025-01-14 DIAGNOSIS — E78.1 PURE HYPERGLYCERIDEMIA: ICD-10-CM

## 2025-01-14 DIAGNOSIS — M43.22 FUSION OF SPINE, CERVICAL REGION: ICD-10-CM

## 2025-01-14 DIAGNOSIS — G47.26 CIRCADIAN RHYTHM SLEEP DISORDER, SHIFT WORK TYPE: ICD-10-CM

## 2025-01-14 DIAGNOSIS — E78.6 LIPOPROTEIN DEFICIENCY: ICD-10-CM

## 2025-01-14 DIAGNOSIS — R68.82 DECREASED LIBIDO: ICD-10-CM

## 2025-01-14 LAB
25(OH)D3 SERPL-MCNC: 27 NG/ML (ref 30–100)
ALBUMIN SERPL BCP-MCNC: 4.3 G/DL (ref 3.4–5)
ALP SERPL-CCNC: 57 U/L (ref 33–120)
ALT SERPL W P-5'-P-CCNC: 15 U/L (ref 10–52)
ANION GAP SERPL CALC-SCNC: 12 MMOL/L (ref 10–20)
AST SERPL W P-5'-P-CCNC: 14 U/L (ref 9–39)
BASOPHILS # BLD AUTO: 0.02 X10*3/UL (ref 0–0.1)
BASOPHILS NFR BLD AUTO: 0.4 %
BILIRUB SERPL-MCNC: 0.9 MG/DL (ref 0–1.2)
BUN SERPL-MCNC: 7 MG/DL (ref 6–23)
CALCIUM SERPL-MCNC: 9.5 MG/DL (ref 8.6–10.3)
CHLORIDE SERPL-SCNC: 104 MMOL/L (ref 98–107)
CHOLEST SERPL-MCNC: 152 MG/DL (ref 0–199)
CHOLESTEROL/HDL RATIO: 4.8
CO2 SERPL-SCNC: 28 MMOL/L (ref 21–32)
CREAT SERPL-MCNC: 1.29 MG/DL (ref 0.5–1.3)
DHEA-S SERPL-MCNC: 236 UG/DL (ref 70–310)
EGFRCR SERPLBLD CKD-EPI 2021: 67 ML/MIN/1.73M*2
EOSINOPHIL # BLD AUTO: 0.16 X10*3/UL (ref 0–0.7)
EOSINOPHIL NFR BLD AUTO: 3.3 %
ERYTHROCYTE [DISTWIDTH] IN BLOOD BY AUTOMATED COUNT: 12.2 % (ref 11.5–14.5)
ESTRADIOL SERPL-MCNC: <19 PG/ML
GGT SERPL-CCNC: 24 U/L (ref 5–64)
GLUCOSE SERPL-MCNC: 106 MG/DL (ref 74–99)
HCT VFR BLD AUTO: 49.7 % (ref 41–52)
HDLC SERPL-MCNC: 31.9 MG/DL
HGB BLD-MCNC: 16.4 G/DL (ref 13.5–17.5)
IMM GRANULOCYTES # BLD AUTO: 0.04 X10*3/UL (ref 0–0.7)
IMM GRANULOCYTES NFR BLD AUTO: 0.8 % (ref 0–0.9)
INSULIN SERPL-ACNC: 9 UIU/ML (ref 3–25)
LDLC SERPL CALC-MCNC: 98 MG/DL
LYMPHOCYTES # BLD AUTO: 0.74 X10*3/UL (ref 1.2–4.8)
LYMPHOCYTES NFR BLD AUTO: 15.4 %
MAGNESIUM SERPL-MCNC: 2.24 MG/DL (ref 1.6–2.4)
MCH RBC QN AUTO: 29.2 PG (ref 26–34)
MCHC RBC AUTO-ENTMCNC: 33 G/DL (ref 32–36)
MCV RBC AUTO: 89 FL (ref 80–100)
MONOCYTES # BLD AUTO: 0.5 X10*3/UL (ref 0.1–1)
MONOCYTES NFR BLD AUTO: 10.4 %
NEUTROPHILS # BLD AUTO: 3.35 X10*3/UL (ref 1.2–7.7)
NEUTROPHILS NFR BLD AUTO: 69.7 %
NON HDL CHOLESTEROL: 120 MG/DL (ref 0–149)
NRBC BLD-RTO: 0 /100 WBCS (ref 0–0)
PLATELET # BLD AUTO: 165 X10*3/UL (ref 150–450)
POTASSIUM SERPL-SCNC: 4.3 MMOL/L (ref 3.5–5.3)
PROGEST SERPL-MCNC: 0.8 NG/ML
PROT SERPL-MCNC: 6.7 G/DL (ref 6.4–8.2)
PSA SERPL-MCNC: 1.48 NG/ML
RBC # BLD AUTO: 5.61 X10*6/UL (ref 4.5–5.9)
SODIUM SERPL-SCNC: 140 MMOL/L (ref 136–145)
T3FREE SERPL-MCNC: 4.1 PG/ML (ref 2.3–4.2)
T4 FREE SERPL-MCNC: 0.73 NG/DL (ref 0.61–1.12)
TRIGL SERPL-MCNC: 112 MG/DL (ref 0–149)
TSH SERPL-ACNC: 0.85 MIU/L (ref 0.44–3.98)
VLDL: 22 MG/DL (ref 0–40)
WBC # BLD AUTO: 4.8 X10*3/UL (ref 4.4–11.3)

## 2025-01-14 PROCEDURE — 85025 COMPLETE CBC W/AUTO DIFF WBC: CPT

## 2025-01-14 PROCEDURE — 84144 ASSAY OF PROGESTERONE: CPT

## 2025-01-14 PROCEDURE — 80053 COMPREHEN METABOLIC PANEL: CPT

## 2025-01-14 PROCEDURE — 84481 FREE ASSAY (FT-3): CPT

## 2025-01-14 PROCEDURE — 84439 ASSAY OF FREE THYROXINE: CPT

## 2025-01-14 PROCEDURE — 84153 ASSAY OF PSA TOTAL: CPT

## 2025-01-14 PROCEDURE — 82977 ASSAY OF GGT: CPT

## 2025-01-14 PROCEDURE — 82306 VITAMIN D 25 HYDROXY: CPT

## 2025-01-14 PROCEDURE — 36415 COLL VENOUS BLD VENIPUNCTURE: CPT

## 2025-01-14 PROCEDURE — 84140 ASSAY OF PREGNENOLONE: CPT

## 2025-01-14 PROCEDURE — 82670 ASSAY OF TOTAL ESTRADIOL: CPT

## 2025-01-14 PROCEDURE — 84402 ASSAY OF FREE TESTOSTERONE: CPT

## 2025-01-14 PROCEDURE — 80061 LIPID PANEL: CPT

## 2025-01-14 PROCEDURE — 82642 DIHYDROTESTOSTERONE: CPT

## 2025-01-14 PROCEDURE — 83735 ASSAY OF MAGNESIUM: CPT

## 2025-01-14 PROCEDURE — 83525 ASSAY OF INSULIN: CPT

## 2025-01-14 PROCEDURE — 84305 ASSAY OF SOMATOMEDIN: CPT

## 2025-01-14 PROCEDURE — 82627 DEHYDROEPIANDROSTERONE: CPT

## 2025-01-14 PROCEDURE — 84443 ASSAY THYROID STIM HORMONE: CPT

## 2025-01-17 LAB
IGF-I SERPL-MCNC: 118 NG/ML (ref 65–222)
IGF-I Z-SCORE SERPL: -0.3

## 2025-01-18 LAB
ANDROSTANOLONE SERPL-MCNC: 708.9 PG/ML (ref 106–719)
PREG SERPL-MCNC: 40 NG/DL (ref 23–173)

## 2025-01-21 ENCOUNTER — APPOINTMENT (OUTPATIENT)
Dept: PRIMARY CARE | Facility: CLINIC | Age: 55
End: 2025-01-21
Payer: COMMERCIAL

## 2025-01-21 VITALS
HEIGHT: 75 IN | BODY MASS INDEX: 25.86 KG/M2 | SYSTOLIC BLOOD PRESSURE: 130 MMHG | WEIGHT: 208 LBS | OXYGEN SATURATION: 97 % | DIASTOLIC BLOOD PRESSURE: 92 MMHG | HEART RATE: 81 BPM

## 2025-01-21 DIAGNOSIS — F51.01 PRIMARY INSOMNIA: ICD-10-CM

## 2025-01-21 DIAGNOSIS — Z00.00 ROUTINE GENERAL MEDICAL EXAMINATION AT A HEALTH CARE FACILITY: Primary | ICD-10-CM

## 2025-01-21 DIAGNOSIS — E29.1 HYPOGONADISM IN MALE: ICD-10-CM

## 2025-01-21 PROCEDURE — 3008F BODY MASS INDEX DOCD: CPT | Performed by: FAMILY MEDICINE

## 2025-01-21 PROCEDURE — 99396 PREV VISIT EST AGE 40-64: CPT | Performed by: FAMILY MEDICINE

## 2025-01-21 RX ORDER — ANASTROZOLE 1 MG/1
TABLET ORAL
COMMUNITY
Start: 2024-10-31

## 2025-01-21 RX ORDER — AMITRIPTYLINE HYDROCHLORIDE 25 MG/1
25 TABLET, FILM COATED ORAL NIGHTLY
Qty: 90 TABLET | Refills: 3 | Status: SHIPPED | OUTPATIENT
Start: 2025-01-21 | End: 2026-01-16

## 2025-01-21 RX ORDER — CLONAZEPAM 1 MG/1
5 TABLET ORAL NIGHTLY PRN
COMMUNITY
Start: 2024-11-18 | End: 2025-01-22 | Stop reason: WASHOUT

## 2025-01-21 RX ORDER — TESTOSTERONE CYPIONATE 1000 MG/10ML
100 INJECTION, SOLUTION INTRAMUSCULAR
Qty: 2 ML | Refills: 0 | COMMUNITY
Start: 2025-01-21 | End: 2025-02-20

## 2025-01-21 ASSESSMENT — ENCOUNTER SYMPTOMS
LIGHT-HEADEDNESS: 0
SHORTNESS OF BREATH: 0
DIFFICULTY URINATING: 0
PALPITATIONS: 0
RHINORRHEA: 0
DIZZINESS: 0
FEVER: 0
COLOR CHANGE: 0
BACK PAIN: 1
NAUSEA: 0
FREQUENCY: 0
CONSTIPATION: 0
DIARRHEA: 0
FATIGUE: 1
CHEST TIGHTNESS: 0
ABDOMINAL PAIN: 0

## 2025-01-21 NOTE — PROGRESS NOTES
"Subjective   Patient ID: Norris Peña is a 54 y.o. male who presents for Annual Exam (Yearly physical).    HPI  Sleep:    - has tried an online doctor for mental health in [place]. Amitriptyline was moved to 50mg but has noticed a decreased libido. Went back to 25mg   - Clonazepam , is able to sleep better at night and waking up feeling refreshed     Sinus    - 8th reconstructive surgery coming up in March 2025 with Dr. Mac Finley at [facility] to remove scar tissue.     Chronic back Pain:    - 200mg CBD helps the pain      Review of Systems   Constitutional:  Positive for fatigue. Negative for fever.   HENT:  Positive for congestion and sneezing. Negative for hearing loss and rhinorrhea.    Respiratory:  Negative for chest tightness and shortness of breath.    Cardiovascular:  Negative for chest pain and palpitations.   Gastrointestinal:  Negative for abdominal pain, constipation, diarrhea and nausea.   Genitourinary:  Negative for difficulty urinating, frequency and urgency.   Musculoskeletal:  Positive for back pain.   Skin:  Negative for color change.   Neurological:  Negative for dizziness and light-headedness.   Psychiatric/Behavioral:  Negative for suicidal ideas.        Objective   BP (!) 130/92   Pulse 81   Ht 1.892 m (6' 2.5\")   Wt 94.3 kg (208 lb)   SpO2 97%   BMI 26.35 kg/m²     Physical Exam  Constitutional:       Appearance: Normal appearance.   HENT:      Head: Normocephalic and atraumatic.      Nose: Nose normal.      Mouth/Throat:      Mouth: Mucous membranes are moist.   Eyes:      Extraocular Movements: Extraocular movements intact.      Conjunctiva/sclera: Conjunctivae normal.   Cardiovascular:      Rate and Rhythm: Normal rate and regular rhythm.      Heart sounds: Normal heart sounds.   Pulmonary:      Effort: Pulmonary effort is normal.      Breath sounds: Normal breath sounds.   Abdominal:      General: Abdomen is flat.      Palpations: Abdomen is soft.   Musculoskeletal:         " General: Normal range of motion.      Cervical back: Normal range of motion.   Neurological:      General: No focal deficit present.      Mental Status: He is alert and oriented to person, place, and time.   Psychiatric:         Mood and Affect: Mood normal.         Behavior: Behavior normal.       Assessment/Plan   Problem List Items Addressed This Visit    None     Assessment:  -Mixed connective tissue disease: Possibly causing low back pain: faild Plaquenil methotrexate,arava, Nucynta, butrans  Depo today      -Julio CMC arthritis:  Refer to ortho     -Insomnia  Failed mirtazapine, amitriptyline  Dx and started psych online from [place]  Klonopin 0.5mg, amitriptyline 25mg   UDS: today (left w/o testing-honest mistake I think- no refills until test completed) contract: today    -Hyperlipidemia     -Vitamin D deficiency     -Multiple polyps    following     -recurrent sinus congestion:  Dr. Mac Finley planning on doing surgery     -hypogonadism   Followed by endo    Health Maintenance Reminder:  -Blood Work: 1/25  -PSA: 1/25  -Hep C: completed  -Colonoscopy: GI  -Lung Cancer: 50-80y  -AAA: 65-79 smoker.   -Shingrix: >50y  -Prevnar 20: 65y  -Flu: Yearly

## 2025-01-22 LAB
TESTOSTERONE FREE (CHAN): 446.2 PG/ML (ref 35–155)
TESTOSTERONE,TOTAL,LC-MS/MS: 1150 NG/DL (ref 250–1100)

## 2025-01-22 RX ORDER — CLONAZEPAM 0.5 MG/1
0.5 TABLET ORAL NIGHTLY
Qty: 90 TABLET | Refills: 0 | Status: SHIPPED | OUTPATIENT
Start: 2025-01-22 | End: 2025-04-22

## 2025-02-14 ENCOUNTER — OFFICE VISIT (OUTPATIENT)
Facility: CLINIC | Age: 55
End: 2025-02-14
Payer: COMMERCIAL

## 2025-02-14 VITALS — HEART RATE: 77 BPM | WEIGHT: 207 LBS | HEIGHT: 75 IN | BODY MASS INDEX: 25.74 KG/M2

## 2025-02-14 DIAGNOSIS — K21.00 GASTROESOPHAGEAL REFLUX DISEASE WITH ESOPHAGITIS WITHOUT HEMORRHAGE: ICD-10-CM

## 2025-02-14 DIAGNOSIS — K58.0 IRRITABLE BOWEL SYNDROME WITH DIARRHEA: ICD-10-CM

## 2025-02-14 PROCEDURE — 3008F BODY MASS INDEX DOCD: CPT | Performed by: INTERNAL MEDICINE

## 2025-02-14 PROCEDURE — 99214 OFFICE O/P EST MOD 30 MIN: CPT | Performed by: INTERNAL MEDICINE

## 2025-02-14 RX ORDER — DEXLANSOPRAZOLE 60 MG/1
60 CAPSULE, DELAYED RELEASE ORAL EVERY MORNING
Qty: 90 CAPSULE | Refills: 3 | Status: SHIPPED | OUTPATIENT
Start: 2025-02-14

## 2025-02-14 RX ORDER — ELUXADOLINE 75 MG/1
1 TABLET, FILM COATED ORAL 2 TIMES DAILY
Qty: 180 TABLET | Refills: 3 | Status: SHIPPED | OUTPATIENT
Start: 2025-02-14

## 2025-02-14 NOTE — PROGRESS NOTES
Primary Care Provider: Cory Ramirez DO  Referring Provider: No ref. provider found  My final recommendations will be communicated back to the referring physician and/or primary care provider via shared medical records or via US mail.    Chief Complaint (Reason for visit):   Norris Peña is a 54 y.o. male who presents for diarrhea    ASSESSMENT AND PLAN     Assessment & Plan  Irritable bowel syndrome with diarrhea  Good response to Viberzi  Is also on Elavil, which he takes as a sleep aid  Last Cscope in 2023 reviewed. Recall in 3 years    Orders:    eluxadoline (Viberzi) 75 mg tablet; Take 1 tablet (75 mg) by mouth 2 times a day. [facility] SEE DOCTOR IN [facility]    Gastroesophageal reflux disease with esophagitis without hemorrhage  Controlled on Dexilant  1/2024 EGD reviewed - No BE    Orders:    dexlansoprazole (Dexilant) 60 mg DR capsule; Take 1 capsule (60 mg) by mouth once daily in the morning. 30 MINS BEFORE BREAKFAST        Marva López MD, MS    SUBJECTIVE   HPI: History obtained from patient    53 yo who comes to see me for establishing care    A) GERD  Has heartburn symptoms  Controlled on Dexilant  1/2024 EGD reviewed - No BE    B) Diarrhea  Has been Dxed with IBS-D  Good response to Viberzi  Is also on Elavil, which he takes as a sleep aid  Last Cscope in 2023 reviewed. Recall in 3 years  Past Medical History:   Diagnosis Date    Disorder of the skin and subcutaneous tissue, unspecified 05/25/2018    Skin lesion    Encounter for immunization 10/27/2015    Needs flu shot    Encounter for immunization 09/06/2016    Need for prophylactic vaccination and inoculation against influenza    Encounter for screening for diabetes mellitus 06/15/2017    Diabetes mellitus screening    Encounter for screening for diseases of the blood and blood-forming organs and certain disorders involving the immune mechanism 06/15/2017    Screening for deficiency anemia    Encounter for screening for lipoid disorders 06/15/2017     Screening, lipid    Encounter for screening for respiratory tuberculosis 12/31/2015    Tuberculosis screening    Hemorrhage of anus and rectum 09/17/2012    Hemorrhage of rectum and anus    Other conditions influencing health status 05/11/2015    Myalgia and myositis    Pain in unspecified joint 05/03/2016    Joint pain    Pain in unspecified knee 05/03/2016    Knee pain    Personal history of other diseases of the nervous system and sense organs 04/19/2013    History of sciatica    Personal history of other infectious and parasitic diseases 04/12/2017    History of candidiasis of mouth    Personal history of other mental and behavioral disorders 02/18/2013    History of depression    Personal history of other specified conditions 09/02/2016    History of diarrhea    Personal history of other specified conditions 07/20/2016    History of shortness of breath    Testicular hypofunction 07/05/2016    Testicular hypofunction    Unspecified injury of unspecified lower leg, initial encounter 05/18/2015    Lower extremity injury       Past Surgical History:   Procedure Laterality Date    CERVICAL DISCECTOMY  02/23/2015    Spinal Diskectomy Cervical    OTHER SURGICAL HISTORY  02/23/2015    Spinal Diskectomy Lumbar       Current Outpatient Medications   Medication Sig Dispense Refill    amitriptyline (Elavil) 25 mg tablet Take 1 tablet (25 mg) by mouth once daily at bedtime. 90 tablet 3    anastrozole (Arimidex) 1 mg tablet TAKE 1 TABLET BY MOUTH EVERY DAY except days OF testosterone injection      clonazePAM (KlonoPIN) 0.5 mg tablet Take 1 tablet (0.5 mg) by mouth once daily at bedtime. 90 tablet 0    clotrimazole (Mycelex) 10 mg charissa DISSOLVE 1 (ONE) TABLET BY MOUTH FIVE TIMES DAILY AS DIRECTED 70 Charissa 1    dexlansoprazole (Dexilant) 60 mg DR capsule Take 1 capsule (60 mg) by mouth once daily in the morning. 30 MINS BEFORE BREAKFAST 90 capsule 3    eluxadoline (Viberzi) 75 mg tablet Take 1 tablet (75 mg) by mouth 2  "times a day. MUST SEE DOCTOR IN [facility] 180 tablet 1    ipratropium (Atrovent) 42 mcg (0.06 %) nasal spray ADMINISTER 2 SPRAYS INTO EACH NOSTRIL 3 TIMES A DAY AS NEEDED FOR RHINITIS. 45 mL 1    multivitamin (Daily Multi-Vitamin) tablet Take by mouth.      testosterone cypionate (Depo-Testosterone) 100 mg/mL injection Inject 1 mL (100 mg) into the muscle every 14 (fourteen) days. 2 mL 0     No current facility-administered medications for this visit.       Allergies   Allergen Reactions    Celecoxib Other     JC KIN SYNDROM    Nsaids (Non-Steroidal Anti-Inflammatory Drug) Other     JC KIN SYNDROM     OBJECTIVE   PHYSICAL EXAM:  Pulse 77   Ht 1.892 m (6' 2.5\")   Wt 93.9 kg (207 lb)   BMI 26.22 kg/m²      LABS/IMAGING/SCOPES  Lab Results   Component Value Date    WBC 4.8 01/14/2025    HGB 16.4 01/14/2025    HCT 49.7 01/14/2025    MCV 89 01/14/2025     01/14/2025     Lab Results   Component Value Date    GLUCOSE 106 (H) 01/14/2025    CALCIUM 9.5 01/14/2025     01/14/2025    K 4.3 01/14/2025    CO2 28 01/14/2025     01/14/2025    BUN 7 01/14/2025    CREATININE 1.29 01/14/2025     Lab Results   Component Value Date    ALT 15 01/14/2025    AST 14 01/14/2025    GGT 24 01/14/2025    ALKPHOS 57 01/14/2025    BILITOT 0.9 01/14/2025       === 01/26/24 ===    CT ANGIO CHEST FOR PULMONARY EMBOLISM    - Impression -  1.  No pulmonary emboli or confluent airspace disease.      MACRO:  None    Signed by: Jc Lugo 1/26/2024 10:16 AM  Dictation workstation:   [id number]    Marva López MD, MS  "

## 2025-02-14 NOTE — ASSESSMENT & PLAN NOTE
Controlled on Dexilant  1/2024 EGD reviewed - No BE    Orders:    dexlansoprazole (Dexilant) 60 mg DR capsule; Take 1 capsule (60 mg) by mouth once daily in the morning. 30 MINS BEFORE BREAKFAST

## 2025-02-14 NOTE — LETTER
February 14, 2025     Cory COLEMAN DO  [address] [facility] [address]    Patient: Norris Peña   YOB: 1970   Date of Visit: 2/14/2025       Dear Dr. Cory COLEMAN DO:    Thank you for referring Norris Peña to me for evaluation. Below are my notes for this consultation.  If you have questions, please do not hesitate to call me. I look forward to following your patient along with you.       Sincerely,     Marva López MD, MS      CC: No Recipients  ______________________________________________________________________________________    Primary Care Provider: Cory Ramirez DO  Referring Provider: No ref. provider found  My final recommendations will be communicated back to the referring physician and/or primary care provider via shared medical records or via US mail.    Chief Complaint (Reason for visit):   Norris Peña is a 54 y.o. male who presents for diarrhea    ASSESSMENT AND PLAN     Assessment & Plan  Irritable bowel syndrome with diarrhea  Good response to Viberzi  Is also on Elavil, which he takes as a sleep aid  Last Cscope in 2023 reviewed. Recall in 3 years    Orders:  •  eluxadoline (Viberzi) 75 mg tablet; Take 1 tablet (75 mg) by mouth 2 times a day. MUST SEE DOCTOR IN [facility]    Gastroesophageal reflux disease with esophagitis without hemorrhage  Controlled on Dexilant  1/2024 EGD reviewed - No BE    Orders:  •  dexlansoprazole (Dexilant) 60 mg DR capsule; Take 1 capsule (60 mg) by mouth once daily in the morning. 30 MINS BEFORE BREAKFAST        Marva López MD, MS    SUBJECTIVE   HPI: History obtained from patient    55 yo who comes to see me for establishing care    A) GERD  Has heartburn symptoms  Controlled on Dexilant  1/2024 EGD reviewed - No BE    B) Diarrhea  Has been Dxed with IBS-D  Good response to Viberzi  Is also on Elavil, which he takes as a sleep aid  Last Cscope in 2023 reviewed. Recall in 3 years  Past Medical History:   Diagnosis Date   • Disorder of  the skin and subcutaneous tissue, unspecified 05/25/2018    Skin lesion   • Encounter for immunization 10/27/2015    Needs flu shot   • Encounter for immunization 09/06/2016    Need for prophylactic vaccination and inoculation against influenza   • Encounter for screening for diabetes mellitus 06/15/2017    Diabetes mellitus screening   • Encounter for screening for diseases of the blood and blood-forming organs and certain disorders involving the immune mechanism 06/15/2017    Screening for deficiency anemia   • Encounter for screening for lipoid disorders 06/15/2017    Screening, lipid   • Encounter for screening for respiratory tuberculosis 12/31/2015    Tuberculosis screening   • Hemorrhage of anus and rectum 09/17/2012    Hemorrhage of rectum and anus   • Other conditions influencing health status 05/11/2015    Myalgia and myositis   • Pain in unspecified joint 05/03/2016    Joint pain   • Pain in unspecified knee 05/03/2016    Knee pain   • Personal history of other diseases of the nervous system and sense organs 04/19/2013    History of sciatica   • Personal history of other infectious and parasitic diseases 04/12/2017    History of candidiasis of mouth   • Personal history of other mental and behavioral disorders 02/18/2013    History of depression   • Personal history of other specified conditions 09/02/2016    History of diarrhea   • Personal history of other specified conditions 07/20/2016    History of shortness of breath   • Testicular hypofunction 07/05/2016    Testicular hypofunction   • Unspecified injury of unspecified lower leg, initial encounter 05/18/2015    Lower extremity injury       Past Surgical History:   Procedure Laterality Date   • CERVICAL DISCECTOMY  02/23/2015    Spinal Diskectomy Cervical   • OTHER SURGICAL HISTORY  02/23/2015    Spinal Diskectomy Lumbar       Current Outpatient Medications   Medication Sig Dispense Refill   • amitriptyline (Elavil) 25 mg tablet Take 1 tablet (25 mg)  "by mouth once daily at bedtime. 90 tablet 3   • anastrozole (Arimidex) 1 mg tablet TAKE 1 TABLET BY MOUTH EVERY DAY except days OF testosterone injection     • clonazePAM (KlonoPIN) 0.5 mg tablet Take 1 tablet (0.5 mg) by mouth once daily at bedtime. 90 tablet 0   • clotrimazole (Mycelex) 10 mg charissa DISSOLVE 1 (ONE) TABLET BY MOUTH FIVE TIMES DAILY AS DIRECTED 70 Charissa 1   • dexlansoprazole (Dexilant) 60 mg DR capsule Take 1 capsule (60 mg) by mouth once daily in the morning. 30 MINS BEFORE BREAKFAST 90 capsule 3   • eluxadoline (Viberzi) 75 mg tablet Take 1 tablet (75 mg) by mouth 2 times a day. MUST SEE DOCTOR IN [place] 180 tablet 1   • ipratropium (Atrovent) 42 mcg (0.06 %) nasal spray ADMINISTER 2 SPRAYS INTO EACH NOSTRIL 3 TIMES A DAY AS NEEDED FOR RHINITIS. 45 mL 1   • multivitamin (Daily Multi-Vitamin) tablet Take by mouth.     • testosterone cypionate (Depo-Testosterone) 100 mg/mL injection Inject 1 mL (100 mg) into the muscle every 14 (fourteen) days. 2 mL 0     No current facility-administered medications for this visit.       Allergies   Allergen Reactions   • Celecoxib Other     JC KIN SYNDROM   • Nsaids (Non-Steroidal Anti-Inflammatory Drug) Other     JC KIN SYNDROM     OBJECTIVE   PHYSICAL EXAM:  Pulse 77   Ht 1.892 m (6' 2.5\")   Wt 93.9 kg (207 lb)   BMI 26.22 kg/m²      LABS/IMAGING/SCOPES  Lab Results   Component Value Date    WBC 4.8 01/14/2025    HGB 16.4 01/14/2025    HCT 49.7 01/14/2025    MCV 89 01/14/2025     01/14/2025     Lab Results   Component Value Date    GLUCOSE 106 (H) 01/14/2025    CALCIUM 9.5 01/14/2025     01/14/2025    K 4.3 01/14/2025    CO2 28 01/14/2025     01/14/2025    BUN 7 01/14/2025    CREATININE 1.29 01/14/2025     Lab Results   Component Value Date    ALT 15 01/14/2025    AST 14 01/14/2025    GGT 24 01/14/2025    ALKPHOS 57 01/14/2025    BILITOT 0.9 01/14/2025       === 01/26/24 ===    CT ANGIO CHEST FOR PULMONARY EMBOLISM    - " Impression -  1.  No pulmonary emboli or confluent airspace disease.      MACRO:  None    Signed by: Mac Lugo 1/26/2024 10:16 AM  Dictation workstation:   [id number]    Marva López MD, MS

## 2025-02-14 NOTE — ASSESSMENT & PLAN NOTE
Good response to Viberzi  Is also on Elavil, which he takes as a sleep aid  Last Cscope in 2023 reviewed. Recall in 3 years    Orders:    eluxadoline (Viberzi) 75 mg tablet; Take 1 tablet (75 mg) by mouth 2 times a day. MUST SEE DOCTOR IN [facility]

## 2025-02-28 ENCOUNTER — APPOINTMENT (OUTPATIENT)
Facility: CLINIC | Age: 55
End: 2025-02-28
Payer: COMMERCIAL

## 2025-07-01 ENCOUNTER — TELEPHONE (OUTPATIENT)
Dept: GASTROENTEROLOGY | Facility: CLINIC | Age: 55
End: 2025-07-01
Payer: COMMERCIAL

## 2025-07-01 NOTE — TELEPHONE ENCOUNTER
[facility] with Dr [facility]ópez.     Patient saw Dr López in 2/2025.     Last EGD was in 6/2018.    Would you like for patient to have EGD this year?    Please advise.

## 2025-07-11 DIAGNOSIS — N18.2 STAGE 2 CHRONIC KIDNEY DISEASE: Primary | ICD-10-CM

## 2025-07-22 ENCOUNTER — APPOINTMENT (OUTPATIENT)
Dept: PRIMARY CARE | Facility: CLINIC | Age: 55
End: 2025-07-22
Payer: COMMERCIAL

## 2025-08-13 ENCOUNTER — APPOINTMENT (OUTPATIENT)
Dept: GASTROENTEROLOGY | Facility: EXTERNAL LOCATION | Age: 55
End: 2025-08-13
Payer: COMMERCIAL

## 2025-08-13 DIAGNOSIS — R12 HEARTBURN: ICD-10-CM

## 2025-08-13 DIAGNOSIS — K21.9 GASTRO-ESOPHAGEAL REFLUX DISEASE WITHOUT ESOPHAGITIS: Primary | ICD-10-CM

## 2025-08-13 PROCEDURE — 43235 EGD DIAGNOSTIC BRUSH WASH: CPT | Performed by: INTERNAL MEDICINE
